# Patient Record
(demographics unavailable — no encounter records)

---

## 2025-03-04 NOTE — HISTORY OF PRESENT ILLNESS
[FreeTextEntry1] : 64 yr old here to follow up pap. Patient has hx of HPV and colposcopy in past. Recent pap nl HPV +.( high risk neg)  Has some vaginal dryness.